# Patient Record
Sex: FEMALE | Race: WHITE | Employment: OTHER | URBAN - METROPOLITAN AREA
[De-identification: names, ages, dates, MRNs, and addresses within clinical notes are randomized per-mention and may not be internally consistent; named-entity substitution may affect disease eponyms.]

---

## 2017-03-31 PROCEDURE — 84156 ASSAY OF PROTEIN URINE: CPT | Performed by: INTERNAL MEDICINE

## 2017-03-31 PROCEDURE — 82570 ASSAY OF URINE CREATININE: CPT | Performed by: INTERNAL MEDICINE

## 2017-03-31 PROCEDURE — 81001 URINALYSIS AUTO W/SCOPE: CPT | Performed by: INTERNAL MEDICINE

## 2017-06-01 PROBLEM — E53.8 B12 DEFICIENCY: Status: ACTIVE | Noted: 2017-06-01

## 2017-06-01 PROBLEM — R73.09 ABNORMAL GLUCOSE: Status: ACTIVE | Noted: 2017-06-01

## 2017-06-02 ENCOUNTER — LAB ENCOUNTER (OUTPATIENT)
Dept: LAB | Age: 62
End: 2017-06-02
Attending: Other
Payer: MEDICARE

## 2017-06-02 DIAGNOSIS — Z79.899 ENCOUNTER FOR LONG-TERM (CURRENT) USE OF MEDICATIONS: Primary | ICD-10-CM

## 2017-06-02 PROCEDURE — 83970 ASSAY OF PARATHORMONE: CPT | Performed by: INTERNAL MEDICINE

## 2017-06-02 PROCEDURE — 82570 ASSAY OF URINE CREATININE: CPT | Performed by: INTERNAL MEDICINE

## 2017-06-02 PROCEDURE — 84156 ASSAY OF PROTEIN URINE: CPT | Performed by: INTERNAL MEDICINE

## 2017-06-02 PROCEDURE — 80164 ASSAY DIPROPYLACETIC ACD TOT: CPT

## 2017-06-02 PROCEDURE — 80076 HEPATIC FUNCTION PANEL: CPT

## 2017-06-02 PROCEDURE — 82746 ASSAY OF FOLIC ACID SERUM: CPT | Performed by: INTERNAL MEDICINE

## 2017-06-02 PROCEDURE — 82607 VITAMIN B-12: CPT | Performed by: INTERNAL MEDICINE

## 2017-06-02 PROCEDURE — 81001 URINALYSIS AUTO W/SCOPE: CPT | Performed by: INTERNAL MEDICINE

## 2017-06-05 PROCEDURE — 82523 COLLAGEN CROSSLINKS: CPT | Performed by: INTERNAL MEDICINE

## 2017-06-05 PROCEDURE — 81003 URINALYSIS AUTO W/O SCOPE: CPT | Performed by: INTERNAL MEDICINE

## 2017-06-05 PROCEDURE — 36415 COLL VENOUS BLD VENIPUNCTURE: CPT | Performed by: INTERNAL MEDICINE

## 2017-06-05 PROCEDURE — 82340 ASSAY OF CALCIUM IN URINE: CPT | Performed by: INTERNAL MEDICINE

## 2017-09-29 PROCEDURE — 83970 ASSAY OF PARATHORMONE: CPT | Performed by: INTERNAL MEDICINE

## 2017-09-29 PROCEDURE — 81003 URINALYSIS AUTO W/O SCOPE: CPT | Performed by: INTERNAL MEDICINE

## 2017-10-31 ENCOUNTER — HOSPITAL (OUTPATIENT)
Dept: OTHER | Age: 62
End: 2017-10-31
Attending: PAIN MEDICINE

## 2018-02-01 ENCOUNTER — APPOINTMENT (OUTPATIENT)
Dept: LAB | Age: 63
End: 2018-02-01
Attending: Other
Payer: MEDICARE

## 2018-02-01 DIAGNOSIS — E55.9 VITAMIN D DEFICIENCY: ICD-10-CM

## 2018-02-01 DIAGNOSIS — M25.552 PAIN OF BOTH HIP JOINTS: ICD-10-CM

## 2018-02-01 DIAGNOSIS — M85.80 OSTEOPENIA, UNSPECIFIED LOCATION: ICD-10-CM

## 2018-02-01 DIAGNOSIS — M25.551 PAIN OF BOTH HIP JOINTS: ICD-10-CM

## 2018-02-01 DIAGNOSIS — M35.01 SJOGREN'S SYNDROME WITH KERATOCONJUNCTIVITIS SICCA (HCC): ICD-10-CM

## 2018-02-01 PROBLEM — G47.31 CENTRAL SLEEP APNEA: Status: ACTIVE | Noted: 2018-02-01

## 2018-02-01 PROCEDURE — 80076 HEPATIC FUNCTION PANEL: CPT | Performed by: OTHER

## 2018-02-01 PROCEDURE — 85025 COMPLETE CBC W/AUTO DIFF WBC: CPT | Performed by: OTHER

## 2018-02-01 PROCEDURE — 36415 COLL VENOUS BLD VENIPUNCTURE: CPT | Performed by: OTHER

## 2018-02-01 PROCEDURE — 80164 ASSAY DIPROPYLACETIC ACD TOT: CPT | Performed by: OTHER

## 2018-02-02 PROBLEM — H04.123 DRY EYES, BILATERAL: Status: ACTIVE | Noted: 2018-02-02

## 2018-02-02 PROBLEM — Z79.899 HIGH RISK MEDICATIONS (NOT ANTICOAGULANTS) LONG-TERM USE: Status: ACTIVE | Noted: 2018-02-02

## 2018-02-02 PROBLEM — H26.9 INCIPIENT CATARACT OF BOTH EYES: Status: ACTIVE | Noted: 2018-02-02

## 2018-02-19 PROBLEM — F11.20 NARCOTIC DEPENDENCE (HCC): Status: ACTIVE | Noted: 2018-02-19

## 2018-02-19 PROBLEM — Z97.8 PRESENCE OF INTRATHECAL BACLOFEN PUMP: Status: ACTIVE | Noted: 2018-02-19

## 2018-02-19 PROBLEM — Z99.81 ON HOME O2: Status: ACTIVE | Noted: 2018-02-19

## 2018-02-19 PROBLEM — Z74.09 MOBILITY IMPAIRED: Status: ACTIVE | Noted: 2018-02-19

## 2018-02-23 ENCOUNTER — ANESTHESIA EVENT (OUTPATIENT)
Dept: ENDOSCOPY | Facility: HOSPITAL | Age: 63
End: 2018-02-23
Payer: MEDICARE

## 2018-02-23 ENCOUNTER — SURGERY (OUTPATIENT)
Age: 63
End: 2018-02-23

## 2018-02-23 ENCOUNTER — ANESTHESIA (OUTPATIENT)
Dept: ENDOSCOPY | Facility: HOSPITAL | Age: 63
End: 2018-02-23
Payer: MEDICARE

## 2018-02-23 ENCOUNTER — HOSPITAL ENCOUNTER (OUTPATIENT)
Facility: HOSPITAL | Age: 63
Setting detail: HOSPITAL OUTPATIENT SURGERY
Discharge: HOME | End: 2018-02-23
Attending: INTERNAL MEDICINE | Admitting: INTERNAL MEDICINE
Payer: MEDICARE

## 2018-02-23 VITALS
BODY MASS INDEX: 42.27 KG/M2 | OXYGEN SATURATION: 100 % | TEMPERATURE: 98 F | WEIGHT: 263 LBS | RESPIRATION RATE: 17 BRPM | SYSTOLIC BLOOD PRESSURE: 127 MMHG | DIASTOLIC BLOOD PRESSURE: 63 MMHG | HEIGHT: 66 IN | HEART RATE: 91 BPM

## 2018-02-23 DIAGNOSIS — Z12.11 SPECIAL SCREENING FOR MALIGNANT NEOPLASMS, COLON: ICD-10-CM

## 2018-02-23 PROCEDURE — 0DJD8ZZ INSPECTION OF LOWER INTESTINAL TRACT, VIA NATURAL OR ARTIFICIAL OPENING ENDOSCOPIC: ICD-10-PCS | Performed by: INTERNAL MEDICINE

## 2018-02-23 RX ORDER — NALOXONE HYDROCHLORIDE 0.4 MG/ML
80 INJECTION, SOLUTION INTRAMUSCULAR; INTRAVENOUS; SUBCUTANEOUS AS NEEDED
Status: DISCONTINUED | OUTPATIENT
Start: 2018-02-23 | End: 2018-02-23

## 2018-02-23 RX ORDER — SODIUM CHLORIDE, SODIUM LACTATE, POTASSIUM CHLORIDE, CALCIUM CHLORIDE 600; 310; 30; 20 MG/100ML; MG/100ML; MG/100ML; MG/100ML
INJECTION, SOLUTION INTRAVENOUS CONTINUOUS
Status: DISCONTINUED | OUTPATIENT
Start: 2018-02-23 | End: 2018-02-23

## 2018-02-23 RX ORDER — METOCLOPRAMIDE HYDROCHLORIDE 5 MG/ML
10 INJECTION INTRAMUSCULAR; INTRAVENOUS AS NEEDED
Status: DISCONTINUED | OUTPATIENT
Start: 2018-02-23 | End: 2018-02-23

## 2018-02-23 RX ORDER — METOCLOPRAMIDE HYDROCHLORIDE 5 MG/ML
INJECTION INTRAMUSCULAR; INTRAVENOUS
Status: COMPLETED
Start: 2018-02-23 | End: 2018-02-23

## 2018-02-23 RX ORDER — METOCLOPRAMIDE HYDROCHLORIDE 5 MG/ML
10 INJECTION INTRAMUSCULAR; INTRAVENOUS ONCE
Status: COMPLETED | OUTPATIENT
Start: 2018-02-23 | End: 2018-02-23

## 2018-02-23 NOTE — H&P
GI PRE-OP H&P    CC: Screening for colorectal cancer    HPI:  Severo Anne is a 65year old female who is here for a routine colorectal cancer screening evaluation.  She denies BRBPR or melena, diarrhea, constipation or abdominal pain on a persistent basi date: OTHER SURGICAL HISTORY      Comment: left lumpectomy SLN bx  [place] 1998  No date: OTHER SURGICAL HISTORY      Comment: bilateral mastectomy       [place] 1998  No date: OTHER SURGICAL HISTORY      Comment: 3 reconstruction surgeries, tissue expander Encounters:  02/23/18 : 141/66    Pulse Readings from Last 1 Encounters:  02/23/18 : 73    GENERAL: well developed, no acute distress, moves about the room without assistance  HEENT: oropharynx is clear, sclerae are anicteric  CHEST: clear to auscultation

## 2018-02-23 NOTE — BRIEF OP NOTE
Pre-Operative Diagnosis: Special screening for malignant neoplasms, colon [Z12.11]     Post-Operative Diagnosis: Hemorrhoids     Procedure Performed:   Procedure(s):  COLONOSCOPY     Surgeon(s) and Role:     * Mckenzie Riley MD - Primary    Assista

## 2018-02-23 NOTE — OPERATIVE REPORT
[address] [facility] OPERATIVE REPORT  PATIENT NAME: Filemon Haile  : 10/29/1955  MRN: [id number]  DATE OF OPERATION: 2018    PROCEDURE PERFORMED: Colonoscopy with MAC    SURGEON: Chemo Leone MD     INDICATIONS:     1. Screening for colorectal masses. IMPRESSION:     Internal hemorrhoids      RECOMMENDATIONS:    1.  Repeat colonoscopy is recommended in 10 years with MAC sedation, pediatric colonoscope        CC: Marybel Diallo MD, Donte ROMERO

## 2018-02-23 NOTE — ANESTHESIA PREPROCEDURE EVALUATION
PRE-OP EVALUATION    Patient Name: Devin Parra Mast    Pre-op Diagnosis: Special screening for malignant neoplasms, colon [Z12.11]    Procedure(s):  COLONOSCOPY     Surgeon(s) and Role:     * Gillian Borges MD - Primary    Pre-op vitals reviewed. TABLET BY MOUTH EVERY DAY AS NEEDED Disp: 30 tablet Rfl: 1   Calcium Citrate (CITRACAL OR) Take 1,200 mg by mouth daily. Disp:  Rfl:    fentaNYL (DURAGESIC) 50 MCG/HR Transdermal Patch 72 Hr Place 1 patch onto the skin.  Every 2 days Disp:  Rfl:    ARTIFICI Comment: cervical discectomy C4-5 and fusion 1990  No date: OTHER SURGICAL HISTORY      Comment: vision and hearing correction surgeries 85  No date: REMOVE TONSILS/ADENOIDS,<12 Y/O     Smoking status: Former Smoker  2.00 Packs/day  For 21.00 Years     Typ

## 2018-02-23 NOTE — ANESTHESIA POSTPROCEDURE EVALUATION
[address] [facility] Patient Status:  Hospital Outpatient Surgery   Age/Gender 65year old female MRN [id number]   Location [facility]. Attending Tanvir Vaughn MD   Hosp Day # 0 PCP Mukul Daniels MD       Anesthesia Post-op

## 2018-03-30 PROCEDURE — 81001 URINALYSIS AUTO W/SCOPE: CPT | Performed by: INTERNAL MEDICINE

## 2018-04-19 PROCEDURE — 82746 ASSAY OF FOLIC ACID SERUM: CPT | Performed by: INTERNAL MEDICINE

## 2018-04-19 PROCEDURE — 82607 VITAMIN B-12: CPT | Performed by: INTERNAL MEDICINE

## 2018-04-25 ENCOUNTER — APPOINTMENT (OUTPATIENT)
Dept: LAB | Age: 63
End: 2018-04-25
Attending: Other
Payer: MEDICARE

## 2018-04-25 PROCEDURE — 85025 COMPLETE CBC W/AUTO DIFF WBC: CPT | Performed by: OTHER

## 2018-04-25 PROCEDURE — 80076 HEPATIC FUNCTION PANEL: CPT | Performed by: OTHER

## 2018-04-25 PROCEDURE — 80164 ASSAY DIPROPYLACETIC ACD TOT: CPT | Performed by: OTHER

## 2018-10-05 PROCEDURE — 86038 ANTINUCLEAR ANTIBODIES: CPT | Performed by: INTERNAL MEDICINE

## 2018-10-05 PROCEDURE — 81001 URINALYSIS AUTO W/SCOPE: CPT | Performed by: INTERNAL MEDICINE

## 2018-10-05 PROCEDURE — 82523 COLLAGEN CROSSLINKS: CPT | Performed by: INTERNAL MEDICINE

## 2018-11-29 ENCOUNTER — APPOINTMENT (OUTPATIENT)
Dept: GENERAL RADIOLOGY | Facility: HOSPITAL | Age: 63
End: 2018-11-29
Attending: EMERGENCY MEDICINE
Payer: MEDICARE

## 2018-11-29 ENCOUNTER — APPOINTMENT (OUTPATIENT)
Dept: CT IMAGING | Facility: HOSPITAL | Age: 63
End: 2018-11-29
Attending: EMERGENCY MEDICINE
Payer: MEDICARE

## 2018-11-29 ENCOUNTER — HOSPITAL ENCOUNTER (OUTPATIENT)
Facility: HOSPITAL | Age: 63
Setting detail: OBSERVATION
LOS: 1 days | Discharge: HOME | End: 2018-11-30
Attending: EMERGENCY MEDICINE | Admitting: INTERNAL MEDICINE
Payer: MEDICARE

## 2018-11-29 DIAGNOSIS — R00.2 PALPITATIONS: Primary | ICD-10-CM

## 2018-11-29 DIAGNOSIS — R07.89 CHEST PAIN, ATYPICAL: ICD-10-CM

## 2018-11-29 DIAGNOSIS — J98.59 MEDIASTINAL MASS: ICD-10-CM

## 2018-11-29 PROBLEM — E87.6 HYPOKALEMIA: Status: ACTIVE | Noted: 2018-11-29

## 2018-11-29 PROBLEM — E87.1 HYPONATREMIA: Status: ACTIVE | Noted: 2018-11-29

## 2018-11-29 PROBLEM — D72.829 LEUKOCYTOSIS: Status: ACTIVE | Noted: 2018-11-29

## 2018-11-29 PROCEDURE — 85610 PROTHROMBIN TIME: CPT | Performed by: EMERGENCY MEDICINE

## 2018-11-29 PROCEDURE — 93005 ELECTROCARDIOGRAM TRACING: CPT

## 2018-11-29 PROCEDURE — 80053 COMPREHEN METABOLIC PANEL: CPT | Performed by: EMERGENCY MEDICINE

## 2018-11-29 PROCEDURE — 99285 EMERGENCY DEPT VISIT HI MDM: CPT

## 2018-11-29 PROCEDURE — 71045 X-RAY EXAM CHEST 1 VIEW: CPT | Performed by: EMERGENCY MEDICINE

## 2018-11-29 PROCEDURE — 36415 COLL VENOUS BLD VENIPUNCTURE: CPT

## 2018-11-29 PROCEDURE — 85025 COMPLETE CBC W/AUTO DIFF WBC: CPT | Performed by: EMERGENCY MEDICINE

## 2018-11-29 PROCEDURE — 71275 CT ANGIOGRAPHY CHEST: CPT | Performed by: EMERGENCY MEDICINE

## 2018-11-29 PROCEDURE — 84484 ASSAY OF TROPONIN QUANT: CPT | Performed by: EMERGENCY MEDICINE

## 2018-11-29 PROCEDURE — 85730 THROMBOPLASTIN TIME PARTIAL: CPT | Performed by: EMERGENCY MEDICINE

## 2018-11-29 PROCEDURE — 93010 ELECTROCARDIOGRAM REPORT: CPT

## 2018-11-29 RX ORDER — PROCHLORPERAZINE MALEATE 10 MG
10 TABLET ORAL EVERY 6 HOURS PRN
COMMUNITY
End: 2019-02-21

## 2018-11-29 RX ORDER — SODIUM CHLORIDE 9 MG/ML
INJECTION, SOLUTION INTRAVENOUS CONTINUOUS
Status: ACTIVE | OUTPATIENT
Start: 2018-11-29 | End: 2018-11-30

## 2018-11-29 RX ORDER — HYDROCODONE BITARTRATE AND ACETAMINOPHEN 7.5; 325 MG/1; MG/1
1 TABLET ORAL EVERY 6 HOURS PRN
COMMUNITY

## 2018-11-29 RX ORDER — ALPRAZOLAM 0.25 MG/1
0.25 TABLET ORAL NIGHTLY PRN
COMMUNITY
End: 2019-02-21

## 2018-11-29 RX ORDER — FUROSEMIDE 20 MG/1
20 TABLET ORAL AS NEEDED
COMMUNITY
End: 2019-01-21

## 2018-11-29 RX ORDER — VERAPAMIL HYDROCHLORIDE 240 MG/1
240 TABLET, FILM COATED, EXTENDED RELEASE ORAL NIGHTLY
COMMUNITY
End: 2018-12-07

## 2018-11-29 RX ORDER — HYDROXYCHLOROQUINE SULFATE 200 MG/1
200 TABLET, FILM COATED ORAL 2 TIMES DAILY
COMMUNITY
End: 2019-02-21

## 2018-11-29 RX ORDER — POTASSIUM CHLORIDE 20 MEQ/1
20 TABLET, EXTENDED RELEASE ORAL ONCE
Status: COMPLETED | OUTPATIENT
Start: 2018-11-29 | End: 2018-11-29

## 2018-11-29 RX ORDER — SUMATRIPTAN 100 MG/1
100 TABLET, FILM COATED ORAL EVERY 2 HOUR PRN
COMMUNITY
End: 2019-02-21

## 2018-11-29 RX ORDER — ZOLPIDEM TARTRATE 10 MG/1
TABLET ORAL NIGHTLY PRN
COMMUNITY
End: 2019-03-17

## 2018-11-29 RX ORDER — GABAPENTIN 600 MG/1
600 TABLET ORAL 2 TIMES DAILY
COMMUNITY

## 2018-11-29 RX ORDER — POTASSIUM CHLORIDE 750 MG/1
10 TABLET, EXTENDED RELEASE ORAL AS NEEDED
COMMUNITY

## 2018-11-29 RX ORDER — MULTIVITAMIN WITH FOLIC ACID 400 MCG
1 TABLET ORAL DAILY
COMMUNITY

## 2018-11-29 RX ORDER — PREDNISONE 1 MG/1
1 TABLET ORAL EVERY EVENING
COMMUNITY
End: 2019-08-22

## 2018-11-30 ENCOUNTER — APPOINTMENT (OUTPATIENT)
Dept: CV DIAGNOSTICS | Facility: HOSPITAL | Age: 63
End: 2018-11-30
Attending: INTERNAL MEDICINE
Payer: MEDICARE

## 2018-11-30 VITALS
HEIGHT: 66 IN | OXYGEN SATURATION: 94 % | DIASTOLIC BLOOD PRESSURE: 69 MMHG | WEIGHT: 253.5 LBS | BODY MASS INDEX: 40.74 KG/M2 | TEMPERATURE: 99 F | HEART RATE: 83 BPM | SYSTOLIC BLOOD PRESSURE: 139 MMHG | RESPIRATION RATE: 18 BRPM

## 2018-11-30 PROCEDURE — 93306 TTE W/DOPPLER COMPLETE: CPT | Performed by: INTERNAL MEDICINE

## 2018-11-30 PROCEDURE — 87999 UNLISTED MICROBIOLOGY PX: CPT

## 2018-11-30 PROCEDURE — 85025 COMPLETE CBC W/AUTO DIFF WBC: CPT | Performed by: HOSPITALIST

## 2018-11-30 PROCEDURE — 87581 M.PNEUMON DNA AMP PROBE: CPT | Performed by: HOSPITALIST

## 2018-11-30 PROCEDURE — 83615 LACTATE (LD) (LDH) ENZYME: CPT | Performed by: INTERNAL MEDICINE

## 2018-11-30 PROCEDURE — 80048 BASIC METABOLIC PNL TOTAL CA: CPT | Performed by: HOSPITALIST

## 2018-11-30 PROCEDURE — 96372 THER/PROPH/DIAG INJ SC/IM: CPT

## 2018-11-30 PROCEDURE — 87633 RESP VIRUS 12-25 TARGETS: CPT | Performed by: HOSPITALIST

## 2018-11-30 PROCEDURE — 87798 DETECT AGENT NOS DNA AMP: CPT | Performed by: HOSPITALIST

## 2018-11-30 PROCEDURE — 85652 RBC SED RATE AUTOMATED: CPT | Performed by: INTERNAL MEDICINE

## 2018-11-30 PROCEDURE — 87486 CHLMYD PNEUM DNA AMP PROBE: CPT | Performed by: HOSPITALIST

## 2018-11-30 RX ORDER — VERAPAMIL HYDROCHLORIDE 240 MG/1
240 TABLET, FILM COATED, EXTENDED RELEASE ORAL NIGHTLY
Status: DISCONTINUED | OUTPATIENT
Start: 2018-11-30 | End: 2018-11-30

## 2018-11-30 RX ORDER — METOCLOPRAMIDE HYDROCHLORIDE 5 MG/ML
10 INJECTION INTRAMUSCULAR; INTRAVENOUS EVERY 8 HOURS PRN
Status: DISCONTINUED | OUTPATIENT
Start: 2018-11-30 | End: 2018-11-30

## 2018-11-30 RX ORDER — GABAPENTIN 600 MG/1
600 TABLET ORAL 2 TIMES DAILY
Status: DISCONTINUED | OUTPATIENT
Start: 2018-11-30 | End: 2018-11-30

## 2018-11-30 RX ORDER — DIVALPROEX SODIUM 125 MG/1
125 CAPSULE, COATED PELLETS ORAL NIGHTLY
Status: DISCONTINUED | OUTPATIENT
Start: 2018-11-30 | End: 2018-11-30

## 2018-11-30 RX ORDER — HYDROXYCHLOROQUINE SULFATE 200 MG/1
200 TABLET, FILM COATED ORAL 2 TIMES DAILY
Status: DISCONTINUED | OUTPATIENT
Start: 2018-11-30 | End: 2018-11-30

## 2018-11-30 RX ORDER — PANTOPRAZOLE SODIUM 20 MG/1
20 TABLET, DELAYED RELEASE ORAL
Status: DISCONTINUED | OUTPATIENT
Start: 2018-11-30 | End: 2018-11-30

## 2018-11-30 RX ORDER — DIVALPROEX SODIUM 250 MG/1
250 TABLET, DELAYED RELEASE ORAL 2 TIMES DAILY
Status: DISCONTINUED | OUTPATIENT
Start: 2018-11-30 | End: 2018-11-30

## 2018-11-30 RX ORDER — ALBUTEROL SULFATE 90 UG/1
2 AEROSOL, METERED RESPIRATORY (INHALATION) EVERY 4 HOURS PRN
Status: DISCONTINUED | OUTPATIENT
Start: 2018-11-30 | End: 2018-11-30

## 2018-11-30 RX ORDER — FENTANYL 50 UG/H
1 PATCH TRANSDERMAL
Status: DISCONTINUED | OUTPATIENT
Start: 2018-11-30 | End: 2018-11-30

## 2018-11-30 RX ORDER — PREDNISONE 1 MG/1
1 TABLET ORAL EVERY EVENING
Status: DISCONTINUED | OUTPATIENT
Start: 2018-11-30 | End: 2018-11-30

## 2018-11-30 RX ORDER — ZOLPIDEM TARTRATE 5 MG/1
TABLET ORAL NIGHTLY PRN
Status: DISCONTINUED | OUTPATIENT
Start: 2018-11-30 | End: 2018-11-30 | Stop reason: SDUPTHER

## 2018-11-30 RX ORDER — HYDROCODONE BITARTRATE AND ACETAMINOPHEN 7.5; 325 MG/1; MG/1
1 TABLET ORAL EVERY 6 HOURS PRN
Status: DISCONTINUED | OUTPATIENT
Start: 2018-11-30 | End: 2018-11-30 | Stop reason: DRUGHIGH

## 2018-11-30 RX ORDER — ACETAMINOPHEN 325 MG/1
650 TABLET ORAL EVERY 6 HOURS PRN
Status: DISCONTINUED | OUTPATIENT
Start: 2018-11-30 | End: 2018-11-30

## 2018-11-30 RX ORDER — ONDANSETRON 2 MG/ML
4 INJECTION INTRAMUSCULAR; INTRAVENOUS EVERY 6 HOURS PRN
Status: DISCONTINUED | OUTPATIENT
Start: 2018-11-30 | End: 2018-11-30

## 2018-11-30 RX ORDER — HYDROCODONE BITARTRATE AND ACETAMINOPHEN 10; 325 MG/1; MG/1
1 TABLET ORAL EVERY 6 HOURS PRN
Status: DISCONTINUED | OUTPATIENT
Start: 2018-11-30 | End: 2018-11-30

## 2018-11-30 RX ORDER — DIVALPROEX SODIUM 250 MG/1
250 TABLET, DELAYED RELEASE ORAL 2 TIMES DAILY
Status: DISCONTINUED | OUTPATIENT
Start: 2018-12-01 | End: 2018-11-30

## 2018-11-30 RX ORDER — ZOLPIDEM TARTRATE 5 MG/1
5 TABLET ORAL NIGHTLY PRN
Status: DISCONTINUED | OUTPATIENT
Start: 2018-11-30 | End: 2018-11-30

## 2018-11-30 RX ORDER — ZOLPIDEM TARTRATE 10 MG/1
10 TABLET ORAL NIGHTLY PRN
Status: DISCONTINUED | OUTPATIENT
Start: 2018-11-30 | End: 2018-11-30

## 2018-11-30 RX ORDER — LAMOTRIGINE 25 MG/1
25 TABLET ORAL DAILY
Status: DISCONTINUED | OUTPATIENT
Start: 2018-11-30 | End: 2018-11-30

## 2018-11-30 RX ORDER — SUMATRIPTAN 50 MG/1
100 TABLET, FILM COATED ORAL EVERY 2 HOUR PRN
Status: DISCONTINUED | OUTPATIENT
Start: 2018-11-30 | End: 2018-11-30

## 2018-11-30 RX ORDER — HEPARIN SODIUM 5000 [USP'U]/ML
5000 INJECTION, SOLUTION INTRAVENOUS; SUBCUTANEOUS EVERY 12 HOURS SCHEDULED
Status: DISCONTINUED | OUTPATIENT
Start: 2018-11-30 | End: 2018-11-30

## 2018-11-30 NOTE — CONSULTS
[facility] Cardiology Consultation    Hanna Peace Patient Status:  Inpatient    10/29/1955 MRN [id number]   [facility] 2NE-A Attending Lisa Sim MD   Hosp Day # 1 PCP Thalia Bryant MD     Reason for Consultation:  Chest pain, palp's in hip)   • Paroxysmal atrial tachycardia ([facility] [address]     Arrythmias--as per previous cardiologist in [place]--thats why on Verapamil   • Personal history of antineoplastic chemotherapy     1998-99   • PONV (postoperative nausea and vomiting)    • Reactive airw Oral BID   • Hydroxychloroquine Sulfate  200 mg Oral BID   • lamoTRIgine  25 mg Oral Daily   • Pantoprazole Sodium  20 mg Oral QAM AC   • predniSONE  1 mg Oral QPM   • Verapamil HCl ER  240 mg Oral Nightly   • Heparin Sodium (Porcine)  5,000 Units Subcutan - suspect PVC's. Troponin negative. Suspect non cardiac chest pain. 2. Chest mass. 3. MS  4. URI    Plan:  Echo with doppler. Change verapamil to 120 mg qam, 240 mg q pm.  Await f/u for lung mass.     Yanely Gunn  11/30/2018  7:39 AM

## 2018-11-30 NOTE — CM/SW NOTE
COND 44: Patient failed Inpatient criteria. Second level of review completed and supports Observation. UR committee in agreement. Discussed with Dr Flores [place] approves.

## 2018-11-30 NOTE — ED PROVIDER NOTES
Patient Seen in: [facility] Emergency Department    History   Patient presents with:  Arrythmia/Palpitations (cardiovascular)    Stated Complaint: palpatations    HPI    The patient is a 76-year-old female who presents emergency room with a history (deep venous thrombosis) ([facility] [address]     DVT R-leg--started coumadin 10/31/11--   • Migraines    • Multiple sclerosis [facility])     MS-diagnosed 1984-saw Dr. Piyush Cloud 2/10'--last scan 2002--at OSH--weakness and spasticity--in legs-B   • Neuropathy    • Osteoarthriti quittin.3      Smokeless tobacco: Former User    Alcohol use: No      Alcohol/week: 0.0 oz    Drug use: No      Review of Systems    Positive for stated complaint: palpatations  Other systems are as noted in HPI.   Constitutional and vital signs review no new gross motor or sensory deficits appreciated as per patient. Cranial nerves II through XII are intact. Patient is answering all questions appropriately.              ED Course     Labs Reviewed   COMP METABOLIC PANEL (14) - Abnormal; Notable for the consolidation.      Dictated by: Lyly Mora MD on 11/29/2018 at 18:49     Approved by: Lyly Mora MD                MDM   Patient had an IV line established and blood work drawn including a CBC, chemistries, BUN and creatinine, and blood sugar which shows e

## 2018-11-30 NOTE — CONSULTS
[facility]  Report of Consultation    Bina Peace Patient Status:  Inpatient    10/29/1955 MRN [id number]   [facility] 2NE-A Attending Walter Vaughan, *   Hosp Day # 1 PCP Jackeline Florez MD     REASON FOR CONSULTATION:     Me Breast Cancer--pt declines  7/12'--DEXA--improved with Vit D, then, 1/15'--(-1.1) in spine and (-1.5) in hip)   • Paroxysmal atrial tachycardia ([facility] [address]     Arrythmias--as per previous cardiologist in [place]--thats why on Verapamil   • Personal history of a drugs.     Allergies:    Adhesive Tape               Comment:Pt had reaction to EKG electrogel and broke out in             blisters  Nsaids                    Zofran                  OTHER (SEE COMMENTS)    Comment:HA  Clindamycin Hcl             Comment:c rate 18, height 1.676 m (5' 6\"), weight 115 kg (253 lb 8.5 oz), SpO2 95 %, not currently breastfeeding. Performance Status: 2   General: Patient is alert and oriented x 3, not in acute distress.    Vital Signs: BP (!) 152/91 (BP Location: Left arm)   Pu 1.02 mg/dL    BUN/CREA Ratio 13.6 10.0 - 20.0    Calcium, Total 9.3 8.3 - 10.3 mg/dL    Calculated Osmolality 270 (L) 275 - 295 mOsm/kg    GFR, Non- 78 >=60    GFR, -American 90 >=60    AST 12 (L) 15 - 41 U/L    Alt 20 14 - 54 U/L CO2 25.0 22.0 - 32.0 mmol/L    Anion Gap 9 0 - 18 mmol/L    BUN 10 8 - 20 mg/dL    Creatinine 0.71 0.55 - 1.02 mg/dL    BUN/CREA Ratio 14.1 10.0 - 20.0    Calcium, Total 9.5 8.3 - 10.3 mg/dL    Calculated Osmolality 269 (L) 275 - 295 mOsm/kg    GFR, Non Parainlfuenza 2 PCR Negative Negative    Parainlfuenza 3 PCR Negative Negative    Parainlfuenza 4 PCR Negative Negative    Resp Syncytial Virus PCR Negative Negative    Bordetella Pertussis PCR Negative Negative    Chlamydia pneumonia PCR: Negative Negativ Leukocytosis     Palpitations     Chest pain, atypical     Mediastinal mass        ASSESSMENT AND PLAN:     Korey Zamora is a 59year old female with     1. Mediastinal mass - new dx   The DD is NHL, HL and Thymoma.  She has a previous dx of breast cancer

## 2018-11-30 NOTE — PLAN OF CARE
NURSING ADMISSION NOTE      Patient admitted via Cart  Oriented to room. Safety precautions initiated. Bed in low position. Call light in reach. Assumed care of patient at 0000-admitted from ER with C/O palpitations and chest discomfort.  Alert an

## 2018-11-30 NOTE — PROGRESS NOTES
11/29/18 2300 11/29/18 2344 11/29/18 2351   Vital Signs   Pulse 89 94 101   Heart Rate Source Monitor Monitor Monitor   Cardiac Rhythm NSR NSR NSR   Resp 20 18 18   Respiratory Quality Normal Normal Normal   /80 136/75 131/80   BP Location Left ar

## 2018-12-01 NOTE — PROGRESS NOTES
Assumed pt care at 15:00. Discharge education complete and pt verbalizes understanding. IV and tele removed. Transported to lobby via wheelchair without incident.

## 2018-12-06 PROCEDURE — 36415 COLL VENOUS BLD VENIPUNCTURE: CPT | Performed by: INTERNAL MEDICINE

## 2018-12-06 PROCEDURE — 88342 IMHCHEM/IMCYTCHM 1ST ANTB: CPT | Performed by: INTERNAL MEDICINE

## 2018-12-06 PROCEDURE — 88341 IMHCHEM/IMCYTCHM EA ADD ANTB: CPT | Performed by: INTERNAL MEDICINE

## 2018-12-06 PROCEDURE — 88184 FLOWCYTOMETRY/ TC 1 MARKER: CPT | Performed by: INTERNAL MEDICINE

## 2018-12-06 PROCEDURE — 88307 TISSUE EXAM BY PATHOLOGIST: CPT | Performed by: INTERNAL MEDICINE

## 2018-12-06 PROCEDURE — 88185 FLOWCYTOMETRY/TC ADD-ON: CPT | Performed by: INTERNAL MEDICINE

## 2018-12-13 PROBLEM — D49.89 THYMOMA: Status: ACTIVE | Noted: 2018-12-13

## 2018-12-18 ENCOUNTER — LAB ENCOUNTER (OUTPATIENT)
Dept: LAB | Age: 63
End: 2018-12-18
Attending: SURGERY
Payer: MEDICARE

## 2018-12-18 DIAGNOSIS — J98.59 OTHER DISEASES OF MEDIASTINUM, NOT ELSEWHERE CLASSIFIED: Primary | ICD-10-CM

## 2018-12-18 PROCEDURE — 86850 RBC ANTIBODY SCREEN: CPT

## 2018-12-18 PROCEDURE — 86901 BLOOD TYPING SEROLOGIC RH(D): CPT

## 2018-12-18 PROCEDURE — 86900 BLOOD TYPING SEROLOGIC ABO: CPT

## 2018-12-19 ENCOUNTER — LAB ENCOUNTER (OUTPATIENT)
Dept: LAB | Facility: HOSPITAL | Age: 63
End: 2018-12-19
Attending: SURGERY
Payer: MEDICARE

## 2018-12-19 DIAGNOSIS — R91.1 COIN LESION: ICD-10-CM

## 2018-12-19 DIAGNOSIS — J98.59 OTHER DISEASES OF MEDIASTINUM, NOT ELSEWHERE CLASSIFIED: Primary | ICD-10-CM

## 2018-12-19 PROCEDURE — 36415 COLL VENOUS BLD VENIPUNCTURE: CPT | Performed by: OTHER

## 2018-12-19 PROCEDURE — 86255 FLUORESCENT ANTIBODY SCREEN: CPT | Performed by: OTHER

## 2018-12-19 PROCEDURE — 86900 BLOOD TYPING SEROLOGIC ABO: CPT

## 2018-12-19 PROCEDURE — 83519 RIA NONANTIBODY: CPT | Performed by: OTHER

## 2018-12-19 PROCEDURE — 84238 ASSAY NONENDOCRINE RECEPTOR: CPT | Performed by: OTHER

## 2018-12-19 PROCEDURE — 86901 BLOOD TYPING SEROLOGIC RH(D): CPT

## 2018-12-19 PROCEDURE — 83516 IMMUNOASSAY NONANTIBODY: CPT | Performed by: OTHER

## 2018-12-19 PROCEDURE — 86850 RBC ANTIBODY SCREEN: CPT

## 2018-12-27 ENCOUNTER — LAB ENCOUNTER (OUTPATIENT)
Dept: LAB | Age: 63
End: 2018-12-27
Attending: Other
Payer: MEDICARE

## 2018-12-27 DIAGNOSIS — Z79.899 ENCOUNTER FOR LONG-TERM (CURRENT) USE OF OTHER MEDICATIONS: Primary | ICD-10-CM

## 2018-12-27 PROCEDURE — 80164 ASSAY DIPROPYLACETIC ACD TOT: CPT

## 2018-12-27 PROCEDURE — 80076 HEPATIC FUNCTION PANEL: CPT

## 2018-12-27 PROCEDURE — 85025 COMPLETE CBC W/AUTO DIFF WBC: CPT

## 2019-01-21 ENCOUNTER — HOSPITAL ENCOUNTER (EMERGENCY)
Facility: HOSPITAL | Age: 64
Discharge: OTHER INSTITUTION | End: 2019-01-21
Attending: EMERGENCY MEDICINE
Payer: MEDICARE

## 2019-01-21 ENCOUNTER — APPOINTMENT (OUTPATIENT)
Dept: CT IMAGING | Facility: HOSPITAL | Age: 64
End: 2019-01-21
Attending: EMERGENCY MEDICINE
Payer: MEDICARE

## 2019-01-21 ENCOUNTER — APPOINTMENT (OUTPATIENT)
Dept: ULTRASOUND IMAGING | Facility: HOSPITAL | Age: 64
End: 2019-01-21
Attending: EMERGENCY MEDICINE
Payer: MEDICARE

## 2019-01-21 VITALS
HEART RATE: 87 BPM | WEIGHT: 254 LBS | SYSTOLIC BLOOD PRESSURE: 162 MMHG | DIASTOLIC BLOOD PRESSURE: 94 MMHG | BODY MASS INDEX: 40.82 KG/M2 | HEIGHT: 66 IN | RESPIRATION RATE: 18 BRPM | TEMPERATURE: 97 F | OXYGEN SATURATION: 98 %

## 2019-01-21 DIAGNOSIS — R22.2 CHEST MASS: Primary | ICD-10-CM

## 2019-01-21 DIAGNOSIS — I80.9 THROMBOPHLEBITIS: ICD-10-CM

## 2019-01-21 LAB
ALBUMIN SERPL-MCNC: 3.2 G/DL (ref 3.1–4.5)
ALBUMIN/GLOB SERPL: 0.9 {RATIO} (ref 1–2)
ALP LIVER SERPL-CCNC: 86 U/L (ref 50–130)
ALT SERPL-CCNC: 19 U/L (ref 14–54)
ANION GAP SERPL CALC-SCNC: 8 MMOL/L (ref 0–18)
AST SERPL-CCNC: 20 U/L (ref 15–41)
ATRIAL RATE: 92 BPM
BASOPHILS # BLD AUTO: 0.06 X10(3) UL (ref 0–0.1)
BASOPHILS NFR BLD AUTO: 0.6 %
BILIRUB SERPL-MCNC: 0.4 MG/DL (ref 0.1–2)
BUN BLD-MCNC: 10 MG/DL (ref 8–20)
BUN/CREAT SERPL: 12.5 (ref 10–20)
CALCIUM BLD-MCNC: 8.7 MG/DL (ref 8.3–10.3)
CHLORIDE SERPL-SCNC: 99 MMOL/L (ref 101–111)
CO2 SERPL-SCNC: 29 MMOL/L (ref 22–32)
CREAT BLD-MCNC: 0.8 MG/DL (ref 0.55–1.02)
D-DIMER: 7.75 UG/ML FEU (ref 0–0.49)
EOSINOPHIL # BLD AUTO: 0 X10(3) UL (ref 0–0.3)
EOSINOPHIL NFR BLD AUTO: 0 %
ERYTHROCYTE [DISTWIDTH] IN BLOOD BY AUTOMATED COUNT: 14.1 % (ref 11.5–16)
GLOBULIN PLAS-MCNC: 3.7 G/DL (ref 2.8–4.4)
GLUCOSE BLD-MCNC: 102 MG/DL (ref 70–99)
HCT VFR BLD AUTO: 27.3 % (ref 34–50)
HGB BLD-MCNC: 8.7 G/DL (ref 12–16)
IMMATURE GRANULOCYTE COUNT: 0.04 X10(3) UL (ref 0–1)
IMMATURE GRANULOCYTE RATIO %: 0.4 %
LYMPHOCYTES # BLD AUTO: 2.26 X10(3) UL (ref 0.9–4)
LYMPHOCYTES NFR BLD AUTO: 22.6 %
M PROTEIN MFR SERPL ELPH: 6.9 G/DL (ref 6.4–8.2)
MCH RBC QN AUTO: 29.3 PG (ref 27–33.2)
MCHC RBC AUTO-ENTMCNC: 31.9 G/DL (ref 31–37)
MCV RBC AUTO: 91.9 FL (ref 81–100)
MONOCYTES # BLD AUTO: 0.62 X10(3) UL (ref 0.1–1)
MONOCYTES NFR BLD AUTO: 6.2 %
NEUTROPHIL ABS PRELIM: 7.01 X10 (3) UL (ref 1.3–6.7)
NEUTROPHILS # BLD AUTO: 7.01 X10(3) UL (ref 1.3–6.7)
NEUTROPHILS NFR BLD AUTO: 70.2 %
OSMOLALITY SERPL CALC.SUM OF ELEC: 281 MOSM/KG (ref 275–295)
P AXIS: 39 DEGREES
P-R INTERVAL: 172 MS
PLATELET # BLD AUTO: 486 10(3)UL (ref 150–450)
POTASSIUM SERPL-SCNC: 3.7 MMOL/L (ref 3.6–5.1)
PRO-BETA NATRIURETIC PEPTIDE: 62 PG/ML (ref ?–125)
Q-T INTERVAL: 348 MS
QRS DURATION: 80 MS
QTC CALCULATION (BEZET): 430 MS
R AXIS: -9 DEGREES
RBC # BLD AUTO: 2.97 X10(6)UL (ref 3.8–5.1)
RED CELL DISTRIBUTION WIDTH-SD: 47.2 FL (ref 35.1–46.3)
SODIUM SERPL-SCNC: 136 MMOL/L (ref 136–144)
T AXIS: 27 DEGREES
TROPONIN I SERPL-MCNC: <0.046 NG/ML (ref ?–0.05)
VENTRICULAR RATE: 92 BPM
WBC # BLD AUTO: 10 X10(3) UL (ref 4–13)

## 2019-01-21 PROCEDURE — 99285 EMERGENCY DEPT VISIT HI MDM: CPT

## 2019-01-21 PROCEDURE — 80053 COMPREHEN METABOLIC PANEL: CPT | Performed by: EMERGENCY MEDICINE

## 2019-01-21 PROCEDURE — 93010 ELECTROCARDIOGRAM REPORT: CPT

## 2019-01-21 PROCEDURE — 36415 COLL VENOUS BLD VENIPUNCTURE: CPT

## 2019-01-21 PROCEDURE — 71275 CT ANGIOGRAPHY CHEST: CPT | Performed by: EMERGENCY MEDICINE

## 2019-01-21 PROCEDURE — 85378 FIBRIN DEGRADE SEMIQUANT: CPT | Performed by: EMERGENCY MEDICINE

## 2019-01-21 PROCEDURE — 84484 ASSAY OF TROPONIN QUANT: CPT | Performed by: EMERGENCY MEDICINE

## 2019-01-21 PROCEDURE — 93971 EXTREMITY STUDY: CPT | Performed by: EMERGENCY MEDICINE

## 2019-01-21 PROCEDURE — 93005 ELECTROCARDIOGRAM TRACING: CPT

## 2019-01-21 PROCEDURE — 85025 COMPLETE CBC W/AUTO DIFF WBC: CPT | Performed by: EMERGENCY MEDICINE

## 2019-01-21 PROCEDURE — 83880 ASSAY OF NATRIURETIC PEPTIDE: CPT | Performed by: EMERGENCY MEDICINE

## 2019-01-21 RX ORDER — ONDANSETRON 4 MG/1
TABLET, ORALLY DISINTEGRATING ORAL
Status: DISCONTINUED
Start: 2019-01-21 | End: 2019-01-21

## 2019-01-21 NOTE — ED PROVIDER NOTES
Patient Seen in: [facility] Emergency Department    History   Patient presents with:  Swelling Edema (cardiovascular, metabolic)    Stated Complaint: 2 weeks post op tumor removal from chest. Patient has swelling to RLE, concern *    HPI    63-year- Osteoarthritis     si joints   • OSTEOPENIA     DEXA--11/09--no Fosamax b/c of hx Breast Cancer--pt declines  7/12'--DEXA--improved with Vit D, then, 1/15'--(-1.1) in spine and (-1.5) in hip)   • Paroxysmal atrial tachycardia (HCC)     Arrythmias--as per p concern *  Other systems are as noted in HPI. Constitutional and vital signs reviewed. All other systems reviewed and negative except as noted above.     Physical Exam     ED Triage Vitals [01/21/19 0841]   BP (!) 170/98   Pulse 101   Resp 18   Temp 9 (*)     Neutrophil Absolute 7.01 (*)     All other components within normal limits   PRO BETA NATRIURETIC PEPTIDE - Normal   TROPONIN I - Normal   CBC WITH DIFFERENTIAL WITH PLATELET    Narrative:      The following orders were created for panel order CBC W segment and a greater amount of airspace disease adjacent to the left pleural effusion. Findings would be most consistent with pneumonia. There is some interlobular thickening in the left upper lobe which may represent interstitial edema.  MEDIASTINUM:  T base to the level of the transverse aorta. There is slight loculation medially. Please correlate for empyema.     Dictated by: Laura Godfrey MD on 1/21/2019 at 10:34     Approved by: Laura Godfrey MD            Us Venous Doppler Leg Right - Diag will be transferred to [facility] for continuity of care for further diagnostic workup and care for CT finding noted above      MDM   As above            Disposition and Plan     Clinical Impression:  Chest mass  (primary encounter diagnosis)  Thromboph

## 2019-01-21 NOTE — ED INITIAL ASSESSMENT (HPI)
Pt stats she had a tumor removed from her thymus 2 weeks ago. Pt here for right lower extremity noted last night.

## 2019-04-05 PROCEDURE — 81001 URINALYSIS AUTO W/SCOPE: CPT | Performed by: INTERNAL MEDICINE

## 2019-04-05 PROCEDURE — 86038 ANTINUCLEAR ANTIBODIES: CPT | Performed by: INTERNAL MEDICINE

## 2019-04-05 PROCEDURE — 83516 IMMUNOASSAY NONANTIBODY: CPT | Performed by: INTERNAL MEDICINE

## 2019-04-05 PROCEDURE — 84156 ASSAY OF PROTEIN URINE: CPT | Performed by: INTERNAL MEDICINE

## 2019-04-27 PROBLEM — D72.829 LEUKOCYTOSIS: Status: RESOLVED | Noted: 2018-11-29 | Resolved: 2019-04-27

## 2019-04-27 PROBLEM — R00.2 PALPITATIONS: Status: RESOLVED | Noted: 2018-11-29 | Resolved: 2019-04-27

## 2019-04-27 PROBLEM — J98.59 MEDIASTINAL MASS: Status: RESOLVED | Noted: 2018-11-29 | Resolved: 2019-04-27

## 2019-04-27 PROBLEM — E87.1 HYPONATREMIA: Status: RESOLVED | Noted: 2018-11-29 | Resolved: 2019-04-27

## 2019-04-27 PROBLEM — Z79.899 HIGH RISK MEDICATIONS (NOT ANTICOAGULANTS) LONG-TERM USE: Status: RESOLVED | Noted: 2018-02-02 | Resolved: 2019-04-27

## 2019-04-27 PROBLEM — Z74.09 MOBILITY IMPAIRED: Status: RESOLVED | Noted: 2018-02-19 | Resolved: 2019-04-27

## 2019-04-27 PROBLEM — R07.89 CHEST PAIN, ATYPICAL: Status: RESOLVED | Noted: 2018-11-29 | Resolved: 2019-04-27

## 2019-04-27 PROBLEM — E87.6 HYPOKALEMIA: Status: RESOLVED | Noted: 2018-11-29 | Resolved: 2019-04-27

## 2019-08-16 ENCOUNTER — APPOINTMENT (OUTPATIENT)
Dept: LAB | Age: 64
End: 2019-08-16
Attending: Other
Payer: MEDICARE

## 2019-08-16 PROCEDURE — 80164 ASSAY DIPROPYLACETIC ACD TOT: CPT | Performed by: OTHER

## 2019-12-04 PROBLEM — E66.813 OBESITY, CLASS III, BMI 40-49.9 (MORBID OBESITY): Status: ACTIVE | Noted: 2019-12-04

## 2019-12-04 PROBLEM — E66.01 OBESITY, CLASS III, BMI 40-49.9 (MORBID OBESITY) (HCC): Status: ACTIVE | Noted: 2019-12-04

## 2020-11-24 PROBLEM — E66.811 OBESITY (BMI 30.0-34.9): Status: ACTIVE | Noted: 2020-11-24

## 2020-11-24 PROBLEM — H04.123 DRY EYES, BILATERAL: Status: RESOLVED | Noted: 2018-02-02 | Resolved: 2020-11-24

## 2020-11-24 PROBLEM — E66.9 OBESITY (BMI 30.0-34.9): Status: ACTIVE | Noted: 2020-11-24

## 2020-11-24 PROBLEM — E66.01 OBESITY, CLASS III, BMI 40-49.9 (MORBID OBESITY) (HCC): Status: RESOLVED | Noted: 2019-12-04 | Resolved: 2020-11-24

## 2020-11-24 PROBLEM — E66.813 OBESITY, CLASS III, BMI 40-49.9 (MORBID OBESITY): Status: RESOLVED | Noted: 2019-12-04 | Resolved: 2020-11-24

## 2021-03-06 ENCOUNTER — HOSPITAL ENCOUNTER (EMERGENCY)
Facility: HOSPITAL | Age: 66
Discharge: HOME | End: 2021-03-06
Attending: EMERGENCY MEDICINE
Payer: MEDICARE

## 2021-03-06 VITALS
WEIGHT: 200 LBS | BODY MASS INDEX: 31.39 KG/M2 | SYSTOLIC BLOOD PRESSURE: 178 MMHG | DIASTOLIC BLOOD PRESSURE: 94 MMHG | HEIGHT: 67 IN | HEART RATE: 97 BPM | RESPIRATION RATE: 20 BRPM | TEMPERATURE: 99 F | OXYGEN SATURATION: 98 %

## 2021-03-06 DIAGNOSIS — Z48.89 ENCOUNTER FOR POST SURGICAL WOUND CHECK: Primary | ICD-10-CM

## 2021-03-06 DIAGNOSIS — L76.82 OTHER POSTOPERATIVE COMPLICATION OF SKIN: ICD-10-CM

## 2021-03-06 PROCEDURE — 12001 RPR S/N/AX/GEN/TRNK 2.5CM/<: CPT

## 2021-03-06 PROCEDURE — 99282 EMERGENCY DEPT VISIT SF MDM: CPT

## 2021-03-06 PROCEDURE — 99283 EMERGENCY DEPT VISIT LOW MDM: CPT

## 2021-03-06 NOTE — ED PROVIDER NOTES
Patient Seen in: [facility] Emergency Department      History   Patient presents with:  Postop/Procedure Problem    Stated Complaint: post op issue    HPI/Subjective:   HPI    46-year-old female presents to the emergency department secondary to blee atrial tachycardia ([facility] [address]     Arrythmias--as per previous cardiologist in [place]--thats why on Verapamil   • Personal history of antineoplastic chemotherapy     1998-99   • PONV (postoperative nausea and vomiting)    • Reactive airway disease    • Sjogren as noted in HPI. Constitutional and vital signs reviewed. All other systems reviewed and negative except as noted above.     Physical Exam     ED Triage Vitals [03/06/21 0903]   BP (!) 178/94   Pulse 97   Resp 20   Temp 98.7 °F (37.1 °C)   Temp src Te Medication List as of 3/6/2021  9:44 AM

## 2021-03-06 NOTE — ED INITIAL ASSESSMENT (HPI)
Patient had baclofen pump battery replaced yesterday at [facility]". She reports she has drainage from the surgical incision that were in place. She has steri strips at surgical incision that are saturated.  ERMD at bedside on arrival.

## 2021-03-06 NOTE — ED NOTES
ERMD placed dermabond and steri strips. This RN placed telfa dressing and tegaderm over site. Patient to stand and ambulate to ensure dressing remains dry with movement.

## 2021-07-07 PROBLEM — G43.009 MIGRAINE WITHOUT AURA AND WITHOUT STATUS MIGRAINOSUS, NOT INTRACTABLE: Status: ACTIVE | Noted: 2021-07-07

## 2021-07-07 PROBLEM — M54.12 CERVICAL RADICULAR PAIN: Status: ACTIVE | Noted: 2021-07-07

## 2021-10-06 ENCOUNTER — LAB ENCOUNTER (OUTPATIENT)
Dept: LAB | Age: 66
End: 2021-10-06
Attending: INTERNAL MEDICINE
Payer: MEDICARE

## 2021-10-06 DIAGNOSIS — M35.01 SJOGREN'S SYNDROME WITH KERATOCONJUNCTIVITIS SICCA (HCC): ICD-10-CM

## 2021-10-06 DIAGNOSIS — M25.569 POSTERIOR KNEE PAIN, UNSPECIFIED LATERALITY: ICD-10-CM

## 2021-10-06 DIAGNOSIS — M54.42 CHRONIC LOW BACK PAIN WITH BILATERAL SCIATICA, UNSPECIFIED BACK PAIN LATERALITY: ICD-10-CM

## 2021-10-06 DIAGNOSIS — M54.41 CHRONIC LOW BACK PAIN WITH BILATERAL SCIATICA, UNSPECIFIED BACK PAIN LATERALITY: ICD-10-CM

## 2021-10-06 DIAGNOSIS — M85.80 OSTEOPENIA, UNSPECIFIED LOCATION: ICD-10-CM

## 2021-10-06 DIAGNOSIS — E55.9 VITAMIN D DEFICIENCY: ICD-10-CM

## 2021-10-06 DIAGNOSIS — G89.29 CHRONIC LOW BACK PAIN WITH BILATERAL SCIATICA, UNSPECIFIED BACK PAIN LATERALITY: ICD-10-CM

## 2021-10-06 PROCEDURE — 84520 ASSAY OF UREA NITROGEN: CPT

## 2021-10-06 PROCEDURE — 82565 ASSAY OF CREATININE: CPT

## 2021-10-06 PROCEDURE — 80076 HEPATIC FUNCTION PANEL: CPT

## 2021-10-06 PROCEDURE — 86140 C-REACTIVE PROTEIN: CPT

## 2021-10-06 PROCEDURE — 36415 COLL VENOUS BLD VENIPUNCTURE: CPT

## 2021-10-06 PROCEDURE — 82306 VITAMIN D 25 HYDROXY: CPT

## 2021-10-06 PROCEDURE — 85652 RBC SED RATE AUTOMATED: CPT

## 2021-10-06 PROCEDURE — 85025 COMPLETE CBC W/AUTO DIFF WBC: CPT

## 2021-12-13 PROBLEM — M54.12 CERVICAL RADICULAR PAIN: Status: RESOLVED | Noted: 2021-07-07 | Resolved: 2021-12-13

## 2021-12-13 PROBLEM — G47.31 CENTRAL SLEEP APNEA: Status: RESOLVED | Noted: 2018-02-01 | Resolved: 2021-12-13

## 2022-02-18 PROBLEM — M85.89 OSTEOPENIA OF MULTIPLE SITES: Status: ACTIVE | Noted: 2022-02-18

## 2022-08-25 ENCOUNTER — LAB ENCOUNTER (OUTPATIENT)
Dept: LAB | Age: 67
End: 2022-08-25
Attending: Other
Payer: MEDICARE

## 2022-08-25 DIAGNOSIS — Z79.899 ON VALPROATE THERAPY: Primary | ICD-10-CM

## 2022-08-25 PROCEDURE — 80164 ASSAY DIPROPYLACETIC ACD TOT: CPT | Performed by: OTHER

## 2022-09-19 ENCOUNTER — LAB ENCOUNTER (OUTPATIENT)
Dept: LAB | Age: 67
End: 2022-09-19
Attending: Other

## 2022-09-19 DIAGNOSIS — Z79.899 ON VALPROATE THERAPY: ICD-10-CM

## 2022-09-19 LAB
ALBUMIN SERPL-MCNC: 3.6 G/DL (ref 3.4–5)
ALP LIVER SERPL-CCNC: 53 U/L
ALT SERPL-CCNC: 22 U/L
AST SERPL-CCNC: 15 U/L (ref 15–37)
BASOPHILS # BLD AUTO: 0.05 X10(3) UL (ref 0–0.2)
BASOPHILS NFR BLD AUTO: 0.7 %
BILIRUB DIRECT SERPL-MCNC: 0.2 MG/DL (ref 0–0.2)
BILIRUB SERPL-MCNC: 0.6 MG/DL (ref 0.1–2)
DEPRECATED RDW RBC AUTO: 40.7 FL (ref 35.1–46.3)
EOSINOPHIL # BLD AUTO: 0.18 X10(3) UL (ref 0–0.7)
EOSINOPHIL NFR BLD AUTO: 2.5 %
ERYTHROCYTE [DISTWIDTH] IN BLOOD BY AUTOMATED COUNT: 12.4 % (ref 11–15)
HCT VFR BLD AUTO: 33.3 %
HGB BLD-MCNC: 11.2 G/DL
IMM GRANULOCYTES # BLD AUTO: 0.03 X10(3) UL (ref 0–1)
IMM GRANULOCYTES NFR BLD: 0.4 %
LYMPHOCYTES # BLD AUTO: 2.21 X10(3) UL (ref 1–4)
LYMPHOCYTES NFR BLD AUTO: 30.4 %
MCH RBC QN AUTO: 30 PG (ref 26–34)
MCHC RBC AUTO-ENTMCNC: 33.6 G/DL (ref 31–37)
MCV RBC AUTO: 89.3 FL
MONOCYTES # BLD AUTO: 0.61 X10(3) UL (ref 0.1–1)
MONOCYTES NFR BLD AUTO: 8.4 %
NEUTROPHILS # BLD AUTO: 4.19 X10 (3) UL (ref 1.5–7.7)
NEUTROPHILS # BLD AUTO: 4.19 X10(3) UL (ref 1.5–7.7)
NEUTROPHILS NFR BLD AUTO: 57.6 %
PLATELET # BLD AUTO: 223 10(3)UL (ref 150–450)
PROT SERPL-MCNC: 6.3 G/DL (ref 6.4–8.2)
RBC # BLD AUTO: 3.73 X10(6)UL
WBC # BLD AUTO: 7.3 X10(3) UL (ref 4–11)

## 2022-09-19 PROCEDURE — 80076 HEPATIC FUNCTION PANEL: CPT

## 2022-09-19 PROCEDURE — 85025 COMPLETE CBC W/AUTO DIFF WBC: CPT

## 2022-09-23 ENCOUNTER — OFFICE VISIT (OUTPATIENT)
Dept: FAMILY MEDICINE CLINIC | Facility: CLINIC | Age: 67
End: 2022-09-23

## 2022-09-23 VITALS — RESPIRATION RATE: 18 BRPM | TEMPERATURE: 98 F | HEART RATE: 91 BPM | OXYGEN SATURATION: 99 %

## 2022-09-23 DIAGNOSIS — L08.9 TOE INFECTION: Primary | ICD-10-CM

## 2022-09-23 RX ORDER — AMOXICILLIN AND CLAVULANATE POTASSIUM 875; 125 MG/1; MG/1
1 TABLET, FILM COATED ORAL 2 TIMES DAILY
Qty: 14 TABLET | Refills: 0 | Status: SHIPPED | OUTPATIENT
Start: 2022-09-23 | End: 2022-09-30

## 2022-09-23 NOTE — PROGRESS NOTES
Result noted. And reviewed with the patient. She is a little anemic. She will follow up with her primary care.

## 2022-09-30 ENCOUNTER — HOSPITAL ENCOUNTER (OUTPATIENT)
Age: 67
Discharge: HOME | End: 2022-09-30
Payer: MEDICARE

## 2022-09-30 ENCOUNTER — OFFICE VISIT (OUTPATIENT)
Dept: FAMILY MEDICINE CLINIC | Facility: CLINIC | Age: 67
End: 2022-09-30

## 2022-09-30 ENCOUNTER — APPOINTMENT (OUTPATIENT)
Dept: GENERAL RADIOLOGY | Age: 67
End: 2022-09-30
Attending: NURSE PRACTITIONER
Payer: MEDICARE

## 2022-09-30 DIAGNOSIS — L08.9 TOE INFECTION: Primary | ICD-10-CM

## 2022-09-30 DIAGNOSIS — Z02.9 ENCOUNTER FOR ADMINISTRATIVE EXAMINATIONS: ICD-10-CM

## 2022-09-30 DIAGNOSIS — M79.89 TOE SWELLING: Primary | ICD-10-CM

## 2022-09-30 PROCEDURE — 73660 X-RAY EXAM OF TOE(S): CPT | Performed by: NURSE PRACTITIONER

## 2022-09-30 PROCEDURE — 99213 OFFICE O/P EST LOW 20 MIN: CPT

## 2022-09-30 RX ORDER — METHYLPREDNISOLONE 4 MG/1
TABLET ORAL
Qty: 1 EACH | Refills: 0 | Status: SHIPPED | OUTPATIENT
Start: 2022-09-30

## 2022-09-30 RX ORDER — DOXYCYCLINE HYCLATE 100 MG/1
100 CAPSULE ORAL 2 TIMES DAILY
Qty: 14 CAPSULE | Refills: 0 | Status: SHIPPED | OUTPATIENT
Start: 2022-09-30 | End: 2022-10-07

## 2022-09-30 NOTE — ED INITIAL ASSESSMENT (HPI)
Pt aox4. Pt states fell off of the step stool at the doctors office 11/2 weeks ago and injured rt great toe pain, and redness. Pt was seen at [facility] and rx- Augmentin. Pt recently completed augmentin. Pt states rt great toe not improving. Pt states green drainage is coming out bottom of rt great toe nail. Pt denies fever.

## 2023-09-20 ENCOUNTER — LAB ENCOUNTER (OUTPATIENT)
Dept: LAB | Age: 68
End: 2023-09-20
Attending: Other
Payer: MEDICARE

## 2023-09-20 PROCEDURE — 85025 COMPLETE CBC W/AUTO DIFF WBC: CPT | Performed by: OTHER

## 2023-09-20 PROCEDURE — 80164 ASSAY DIPROPYLACETIC ACD TOT: CPT | Performed by: OTHER

## 2023-09-20 PROCEDURE — 80076 HEPATIC FUNCTION PANEL: CPT | Performed by: OTHER

## 2024-11-06 LAB
APPEARANCE UR: CLEAR
BACTERIA #/AREA URNS HPF: ABNORMAL /HPF
BACTERIA UR CULT: NORMAL
BACTERIA UR CULT: NORMAL
BILIRUB UR QL STRIP: NEGATIVE
COLOR UR: YELLOW
GLUCOSE UR QL STRIP: NEGATIVE
HGB UR QL STRIP: NEGATIVE
HYALINE CASTS #/AREA URNS LPF: ABNORMAL /LPF
KETONES UR QL STRIP: NEGATIVE
LEUKOCYTE ESTERASE UR QL STRIP: ABNORMAL
NITRITE UR QL STRIP: NEGATIVE
PH UR STRIP: 7 [PH] (ref 5–8)
PROT UR QL STRIP: NEGATIVE
RBC #/AREA URNS HPF: ABNORMAL /HPF
SERVICE CMNT-IMP: ABNORMAL
SP GR UR STRIP: 1.01 (ref 1–1.03)
SQUAMOUS #/AREA URNS HPF: ABNORMAL /HPF
WBC #/AREA URNS HPF: ABNORMAL /HPF

## 2025-05-27 ENCOUNTER — HOSPITAL ENCOUNTER (EMERGENCY)
Facility: HOSPITAL | Age: 70
Discharge: LEFT BEFORE DISCHARGE | End: 2025-05-27
Attending: EMERGENCY MEDICINE
Payer: MEDICARE

## 2025-05-27 ENCOUNTER — APPOINTMENT (OUTPATIENT)
Dept: GENERAL RADIOLOGY | Facility: HOSPITAL | Age: 70
End: 2025-05-27
Attending: EMERGENCY MEDICINE
Payer: MEDICARE

## 2025-05-27 ENCOUNTER — APPOINTMENT (OUTPATIENT)
Dept: CT IMAGING | Facility: HOSPITAL | Age: 70
End: 2025-05-27
Attending: EMERGENCY MEDICINE
Payer: MEDICARE

## 2025-05-27 VITALS
HEART RATE: 87 BPM | OXYGEN SATURATION: 96 % | RESPIRATION RATE: 17 BRPM | SYSTOLIC BLOOD PRESSURE: 149 MMHG | TEMPERATURE: 99 F | DIASTOLIC BLOOD PRESSURE: 68 MMHG

## 2025-05-27 DIAGNOSIS — E87.1 HYPONATREMIA: ICD-10-CM

## 2025-05-27 DIAGNOSIS — R29.6 FALLS FREQUENTLY: Primary | ICD-10-CM

## 2025-05-27 LAB
ALBUMIN SERPL-MCNC: 4.9 G/DL (ref 3.2–4.8)
ALBUMIN/GLOB SERPL: 2 {RATIO} (ref 1–2)
ALP LIVER SERPL-CCNC: 127 U/L (ref 55–142)
ALT SERPL-CCNC: 59 U/L (ref 10–49)
ANION GAP SERPL CALC-SCNC: 12 MMOL/L (ref 0–18)
AST SERPL-CCNC: 107 U/L (ref ?–34)
ATRIAL RATE: 83 BPM
BASOPHILS # BLD AUTO: 0.05 X10(3) UL (ref 0–0.2)
BASOPHILS NFR BLD AUTO: 0.4 %
BILIRUB SERPL-MCNC: 1 MG/DL (ref 0.2–1.1)
BILIRUB UR QL STRIP.AUTO: NEGATIVE
BUN BLD-MCNC: 18 MG/DL (ref 9–23)
CALCIUM BLD-MCNC: 8.9 MG/DL (ref 8.7–10.6)
CHLORIDE SERPL-SCNC: 87 MMOL/L (ref 98–112)
CLARITY UR REFRACT.AUTO: CLEAR
CO2 SERPL-SCNC: 25 MMOL/L (ref 21–32)
COLOR UR AUTO: YELLOW
CREAT BLD-MCNC: 1.05 MG/DL (ref 0.55–1.02)
EGFRCR SERPLBLD CKD-EPI 2021: 58 ML/MIN/1.73M2 (ref 60–?)
EOSINOPHIL # BLD AUTO: 0 X10(3) UL (ref 0–0.7)
EOSINOPHIL NFR BLD AUTO: 0 %
ERYTHROCYTE [DISTWIDTH] IN BLOOD BY AUTOMATED COUNT: 12.3 %
GLOBULIN PLAS-MCNC: 2.4 G/DL (ref 2–3.5)
GLUCOSE BLD-MCNC: 132 MG/DL (ref 70–99)
GLUCOSE UR STRIP.AUTO-MCNC: NORMAL MG/DL
HCT VFR BLD AUTO: 36.1 % (ref 35–48)
HGB BLD-MCNC: 12.9 G/DL (ref 12–16)
IMM GRANULOCYTES # BLD AUTO: 0.11 X10(3) UL (ref 0–1)
IMM GRANULOCYTES NFR BLD: 0.8 %
KETONES UR STRIP.AUTO-MCNC: 60 MG/DL
LEUKOCYTE ESTERASE UR QL STRIP.AUTO: NEGATIVE
LYMPHOCYTES # BLD AUTO: 1.16 X10(3) UL (ref 1–4)
LYMPHOCYTES NFR BLD AUTO: 8.7 %
MCH RBC QN AUTO: 31.3 PG (ref 26–34)
MCHC RBC AUTO-ENTMCNC: 35.7 G/DL (ref 31–37)
MCV RBC AUTO: 87.6 FL (ref 80–100)
MONOCYTES # BLD AUTO: 1.01 X10(3) UL (ref 0.1–1)
MONOCYTES NFR BLD AUTO: 7.6 %
NEUTROPHILS # BLD AUTO: 10.93 X10 (3) UL (ref 1.5–7.7)
NEUTROPHILS # BLD AUTO: 10.93 X10(3) UL (ref 1.5–7.7)
NEUTROPHILS NFR BLD AUTO: 82.5 %
NITRITE UR QL STRIP.AUTO: NEGATIVE
OSMOLALITY SERPL CALC.SUM OF ELEC: 262 MOSM/KG (ref 275–295)
P AXIS: 4 DEGREES
P-R INTERVAL: 196 MS
PH UR STRIP.AUTO: 6 [PH] (ref 5–8)
PLATELET # BLD AUTO: 233 10(3)UL (ref 150–450)
POTASSIUM SERPL-SCNC: 4.1 MMOL/L (ref 3.5–5.1)
PROT SERPL-MCNC: 7.3 G/DL (ref 5.7–8.2)
PROT UR STRIP.AUTO-MCNC: 20 MG/DL
Q-T INTERVAL: 382 MS
QRS DURATION: 80 MS
QTC CALCULATION (BEZET): 448 MS
R AXIS: -20 DEGREES
RBC # BLD AUTO: 4.12 X10(6)UL (ref 3.8–5.3)
RBC UR QL AUTO: NEGATIVE
SODIUM SERPL-SCNC: 124 MMOL/L (ref 136–145)
SP GR UR STRIP.AUTO: 1.02 (ref 1–1.03)
T AXIS: 16 DEGREES
TROPONIN I SERPL HS-MCNC: 29 NG/L (ref ?–34)
UROBILINOGEN UR STRIP.AUTO-MCNC: NORMAL MG/DL
VENTRICULAR RATE: 83 BPM
WBC # BLD AUTO: 13.3 X10(3) UL (ref 4–11)

## 2025-05-27 PROCEDURE — 84484 ASSAY OF TROPONIN QUANT: CPT | Performed by: EMERGENCY MEDICINE

## 2025-05-27 PROCEDURE — 99285 EMERGENCY DEPT VISIT HI MDM: CPT

## 2025-05-27 PROCEDURE — 80053 COMPREHEN METABOLIC PANEL: CPT | Performed by: EMERGENCY MEDICINE

## 2025-05-27 PROCEDURE — 81001 URINALYSIS AUTO W/SCOPE: CPT | Performed by: EMERGENCY MEDICINE

## 2025-05-27 PROCEDURE — 93005 ELECTROCARDIOGRAM TRACING: CPT

## 2025-05-27 PROCEDURE — 96361 HYDRATE IV INFUSION ADD-ON: CPT

## 2025-05-27 PROCEDURE — 70450 CT HEAD/BRAIN W/O DYE: CPT | Performed by: EMERGENCY MEDICINE

## 2025-05-27 PROCEDURE — 85025 COMPLETE CBC W/AUTO DIFF WBC: CPT | Performed by: EMERGENCY MEDICINE

## 2025-05-27 PROCEDURE — 96360 HYDRATION IV INFUSION INIT: CPT

## 2025-05-27 PROCEDURE — 73630 X-RAY EXAM OF FOOT: CPT | Performed by: EMERGENCY MEDICINE

## 2025-05-27 PROCEDURE — 93010 ELECTROCARDIOGRAM REPORT: CPT

## 2025-05-27 RX ORDER — SODIUM CHLORIDE 9 MG/ML
INJECTION, SOLUTION INTRAVENOUS CONTINUOUS
Status: DISCONTINUED | OUTPATIENT
Start: 2025-05-27 | End: 2025-05-27

## 2025-05-27 NOTE — ED INITIAL ASSESSMENT (HPI)
Pt brought from home via EMS for increased weakness for the past 5 days. PMH includes MS, pt believes increased weakness may be related. Pt endorses multiple falls and living alone. Denies falling today, unable to lift herself up. Pt unsure of blood thinners. Hematoma on right upper forehead, hematoma to left cheek bone, Healing skin tear on left side of nose, 2 tears on left cheek bones. Bilateral hematomas/swelling on knees, and right foot hematoma

## 2025-05-27 NOTE — ED PROVIDER NOTES
Patient Seen in: [facility] Emergency Department        History  Chief Complaint   Patient presents with    Weakness    Fall     Multiple falls     Stated Complaint:     Subjective:   HPI            61-year-old female presenting to the emergency department for falls.  Patient's been having multiple falls recently.  To the point she has hit her head a couple of times she is injured her right foot.  She has had no fevers or chills but prior to all this she started having some vomiting diarrhea that was nonbloody nonbilious but that is since resolved.  She feels generally weak.  She denies any other complaints.      Objective:     Past Medical History:    Adrenal insufficiency (Blanco's disease) ([facility])    Adrenal Insufficiency-no current medication/no prednisone    Aspiration pneumonia ([facility])    Aspiration Pneumonia-diagnosed in [place]    Bipolar disorder ([facility])    mild    Breast cancer, left ([facility])    Breast Cancer---s/p L-lumpectomy and then Bilateral mastectomy---1998--s/p CMF-x4 cycles-chemo and tamoxifen x1 year    Cataract    beginning    Central apnea    Central Apnea-on Home O2    Chronic pain    Chronic Pain specialist--Dr. Messi Garcia- [facility]--PPhysiatry--[facility]-- Dr. Jelly Blackburn    Current chronic use of systemic steroids    Chronic steroid use:- on PPI's Omeprazole and ABN DEXA    DVT (deep venous thrombosis) ([facility])    DVT R-leg--started coumadin 10/31/11--    History of tobacco use    11/1/75--7/19/99    Low serum sodium    chornic adn varies between 125-129 mostly    Migraines    Multiple sclerosis (HCC)    MS-diagnosed 1984-saw Dr. Hu 2/10'--last scan 2002--at OSH--weakness and spasticity--in legs-B    Neuropathy    Obesity (BMI 30.0-34.9)    Osteoarthritis    si joints    OSTEOPENIA    DEXA--11/09--no Fosamax b/c of hx Breast Cancer--pt declines  7/12'--DEXA--improved with Vit D, then, 1/15'--(-1.1) in spine and (-1.5) in hip)    Paroxysmal atrial tachycardia ([facility])     Arrythmias--as per previous cardiologist in [place]--thats why on Verapamil    Personal history of antineoplastic chemotherapy        PONV (postoperative nausea and vomiting)    Reactive airway disease (HCC)    Sjogrens syndrome (HCC)    TMJ (dislocation of temporomandibular joint)              Past Surgical History:   Procedure Laterality Date    Colonoscopy  2018    MAC: internal hemorrhoids, repeat 10 years    Colonoscopy N/A 2018    Procedure: COLONOSCOPY;  Surgeon: Maria De Jesus Rodriguez MD;  Location:  ENDOSCOPY    Hysterectomy      age 39    Mastectomy bra      bilateral mastectomy       [place]    Other  14    baclofen pump replaced - Dr. Garcia    Other surgical history      left lumpectomy SLN bx  [place] 1998    Other surgical history      3 reconstruction surgeries, tissue expander implants,revision    Other surgical history      implanted Beclofen pump     Other surgical history      2 spinal cord stimulators implanted     Other surgical history      cervical discectomy C4-5 and fusion     Other surgical history      vision and hearing correction surgeries 85    Remove tonsils/adenoids,<14 y/o      Thymectomy,radical mediast disssec  2019    Dr. Heaton--thymectomy--L-VATS converted sternotomy, thymoma--type B, Stage pTIANK--Q6months CT--scan surveillance                Social History     Socioeconomic History    Marital status:    Tobacco Use    Smoking status: Former     Current packs/day: 0.00     Average packs/day: 2.0 packs/day for 21.0 years (42.0 ttl pk-yrs)     Types: Cigarettes     Start date: 1978     Quit date: 1999     Years since quittin.8    Smokeless tobacco: Former   Substance and Sexual Activity    Alcohol use: No     Alcohol/week: 0.0 standard drinks of alcohol    Drug use: No   Social History Narrative    Single, lives alone, no children     Social Drivers of Health     Food Insecurity: No Food Insecurity (10/3/2024)     Received from [facility] - Food Insecurity     Worried About Running Out of Food in the Last Year: No     Ran Out of Food in the Last Year: No   Transportation Needs: No Transportation Needs (10/3/2024)    Received from [facility] - Transportation     Lack of Transportation: No   Housing Stability: Not At Risk (10/3/2024)    Received from [facility] - Housing/Utilities     Has Housing: Yes     Worried About Losing Housing: No     Unable to Get Utilities: No                                Physical Exam    ED Triage Vitals [05/27/25 0300]   /75   Pulse 89   Resp 15   Temp 98.7 °F (37.1 °C)   Temp src Temporal   SpO2 100 %   O2 Device None (Room air)       Current Vitals:   Vital Signs  BP: 154/75  Pulse: 82  Resp: 16  Temp: 98.7 °F (37.1 °C)  Temp src: Temporal    Oxygen Therapy  SpO2: 93 %  O2 Device: None (Room air)            Physical Exam     Awake alert patient appears no distress HEENT exam shows bruising about the face but otherwise HEENT exam is normal lungs are clear cardiovascular exam regular rhythm abdomen soft nontender extremities no carcinosis there is some ecchymosis noted along the right midfoot there metatarsal #1.  No focal neurologic deficits      ED Course  Labs Reviewed   COMP METABOLIC PANEL (14) - Abnormal; Notable for the following components:       Result Value    Glucose 132 (*)     Sodium 124 (*)     Chloride 87 (*)     Creatinine 1.05 (*)     Calculated Osmolality 262 (*)     eGFR-Cr 58 (*)      (*)     ALT 59 (*)     Albumin 4.9 (*)     All other components within normal limits   CBC WITH DIFFERENTIAL WITH PLATELET - Abnormal; Notable for the following components:    WBC 13.3 (*)     Neutrophil Absolute Prelim 10.93 (*)     Neutrophil Absolute 10.93 (*)     Monocyte Absolute 1.01 (*)     All other components within normal limits   URINALYSIS WITH CULTURE REFLEX - Abnormal; Notable for the following components:    Ketones  Urine 60 (*)     Protein Urine 20 (*)     All other components within normal limits   TROPONIN I HIGH SENSITIVITY - Normal   RAINBOW DRAW BLUE     EKG    Rate, intervals and axes as noted on EKG Report.  Rate: 83  Rhythm: Sinus Rhythm  Reading: No areas of acute ST segment elevation or depression           ED Course as of 05/27/25 0444  ------------------------------------------------------------  Time: 05/27 0324  Value: CBC With Differential With Platelet(!)  Comment: Slightly elevated white cell count  ------------------------------------------------------------  Time: 05/27 0345  Value: Comp Metabolic Panel (14)(!)  Comment: Hyponatremia  ------------------------------------------------------------  Time: 05/27 0345  Value: Troponin I (High Sensitivity)  Comment: Unremarkable  ------------------------------------------------------------  Time: 05/27 0345  Value: XR FOOT, COMPLETE (MIN 3 VIEWS), RIGHT (CPT=03071)  Comment: Independent interpretation shows no acute fracture  ------------------------------------------------------------  Time: 05/27 0401  Value: Urinalysis with Culture Reflex(!)  Comment: Unremarkable  ------------------------------------------------------------  Time: 05/27 0438  Value: CT BRAIN OR HEAD (CPT=70450)  Comment: Independent interpretation by ED physician of CT scan shows no acute hemorrhage  ------------------------------------------------------------  Time: 05/27 0438  Value: XR FOOT, COMPLETE (MIN 3 VIEWS), RIGHT (CPT=73630)  Comment: Independent interpretation by ED physician of x-ray showed no acute fractures.     Differential diagnosis includes subarachnoid hemorrhage subdural hematoma                  MDM             Medical Decision Making  69-year-old female presenting to the emergency department for head injury and fall.  IV established cardiac monitor shows a sinus rhythm pulse ox shows no signs of hypoxia laboratory results show the patient to be hyponatremic otherwise  within acceptable as IV fluids were initiated here in the emergency department this is likely due to the patient has been unable to take her sodium tablets for the last several days due to her other symptoms.  Independent interpretation by ED physician of x-rays show no acute fractures independent interpretation by the ED physician of CT scan brain shows no acute hemorrhage.  I recommended admission.  Understanding the risk of death and permanent disability with her low sodium level and her multiple falls the patient is adamant in going home and AMA form will be signed.  I had a very long discussion with this patient about the life-threatening nature of her decision because she is adamant in going home.  She is to start her sodium tablets back when she gets home after contact her doctor today and is to return to the emergency department for worsening symptoms with complaints  This note was prepared using Dragon [employer] voice recognition dictation software.  As a result errors may occur.  When identified to these areas have been corrected.  While every attempt is made to correct errors during dictation discrepancies may still exist.  Please contact if there are any errors    Problems Addressed:  Falls frequently: acute illness or injury  Hyponatremia: acute illness or injury    Amount and/or Complexity of Data Reviewed  Labs: ordered. Decision-making details documented in ED Course.  Radiology: ordered and independent interpretation performed. Decision-making details documented in ED Course.  ECG/medicine tests: ordered and independent interpretation performed. Decision-making details documented in ED Course.        Disposition and Plan     Clinical Impression:  1. Falls frequently    2. Hyponatremia         Disposition:  [place]  5/27/2025  4:43 am    Follow-up:  No follow-up provider specified.        Medications Prescribed:  Current Discharge Medication List                Supplementary Documentation:

## (undated) DEVICE — ENDOSCOPY PACK - LOWER: Brand: MEDLINE INDUSTRIES, INC.

## (undated) DEVICE — 1200CC GUARDIAN II: Brand: GUARDIAN

## (undated) DEVICE — FILTERLINE NASAL ADULT O2/CO2

## (undated) DEVICE — 3M™ RED DOT™ MONITORING ELECTRODE WITH FOAM TAPE AND STICKY GEL, 50/BAG, 20/CASE, 72/PLT 2570: Brand: RED DOT™

## (undated) DEVICE — Device: Brand: DEFENDO AIR/WATER/SUCTION AND BIOPSY VALVE

## (undated) NOTE — LETTER
[facility]  Thai Whalen 62 [address] [facility], [address] [place] [address]    Consent for Operation    Date: __________________    Time: _______________    1. I authorize the performance upon Amy Peace the following operation:    Procedure(s):  COLONOSCOPY      2.  I videotape. [facility] will not be responsible for storage or maintenance of this tape. 6. For the purpose of advancing medical education, I consent to the admittance of observers to the Operating Room.     7. I authorize the use of any specimen, organs Signature of Patient:   ___________________________    When the patient is a minor or mentally incompetent to give consent:  Signature of person authorized to consent for patient: ___________________________   Relationship to patient: _____________________ drugs/illegal medications). Failure to inform my anesthesiologist about these medicines may increase my risk of anesthetic complications. · If I am allergic to anything or have had a reaction to anesthesia before.     3. I understand how the anesthesia med I have discussed the procedure and information above with the patient (or patient’s representative) and answered their questions. The patient or their representative has agreed to have anesthesia services.     _______________________________________________